# Patient Record
Sex: FEMALE | Race: OTHER | NOT HISPANIC OR LATINO | URBAN - NONMETROPOLITAN AREA
[De-identification: names, ages, dates, MRNs, and addresses within clinical notes are randomized per-mention and may not be internally consistent; named-entity substitution may affect disease eponyms.]

---

## 2023-04-13 ENCOUNTER — OFFICE VISIT (OUTPATIENT)
Dept: URBAN - NONMETROPOLITAN AREA CLINIC 14 | Facility: CLINIC | Age: 43
End: 2023-04-13
Payer: OTHER GOVERNMENT

## 2023-04-13 DIAGNOSIS — H11.053 PERIPHERAL PTERYGIUM, STATIONARY, BILATERAL: ICD-10-CM

## 2023-04-13 DIAGNOSIS — H52.223 REGULAR ASTIGMATISM, BILATERAL: Primary | ICD-10-CM

## 2023-04-13 PROCEDURE — 92004 COMPRE OPH EXAM NEW PT 1/>: CPT | Performed by: OPTOMETRIST

## 2023-04-13 RX ORDER — PROPYLENE GLYCOL 0.06 MG/ML
0.6 % EMULSION OPHTHALMIC
Qty: 10 | Refills: 11 | Status: ACTIVE
Start: 2023-04-13

## 2023-04-13 ASSESSMENT — VISUAL ACUITY
OD: 20/25
OS: 20/25

## 2023-04-13 ASSESSMENT — INTRAOCULAR PRESSURE
OS: 20
OD: 20

## 2023-04-13 NOTE — IMPRESSION/PLAN
Impression: Peripheral pterygium, stationary, bilateral: H11.053. Plan: Pt not ready for surgery at this time. STart ATs QID OU. RTC 6 months for recheck.

## 2025-02-24 ENCOUNTER — OFFICE VISIT (OUTPATIENT)
Dept: URBAN - NONMETROPOLITAN AREA CLINIC 14 | Facility: CLINIC | Age: 45
End: 2025-02-24

## 2025-02-24 DIAGNOSIS — H11.053 PERIPHERAL PTERYGIUM, PROGRESSIVE, BILATERAL: ICD-10-CM

## 2025-02-24 DIAGNOSIS — E11.9 TYPE 2 DIABETES MELLITUS W/O COMPLICATION: ICD-10-CM

## 2025-02-24 DIAGNOSIS — H52.223 REGULAR ASTIGMATISM, BILATERAL: Primary | ICD-10-CM

## 2025-02-24 PROCEDURE — 92015 DETERMINE REFRACTIVE STATE: CPT | Performed by: OPTOMETRIST

## 2025-02-24 PROCEDURE — 99213 OFFICE O/P EST LOW 20 MIN: CPT | Performed by: OPTOMETRIST

## 2025-02-24 ASSESSMENT — VISUAL ACUITY
OS: 20/30
OD: 20/25

## 2025-02-24 ASSESSMENT — INTRAOCULAR PRESSURE
OS: 17
OD: 14